# Patient Record
Sex: MALE | Race: WHITE | NOT HISPANIC OR LATINO | Employment: STUDENT | URBAN - NONMETROPOLITAN AREA
[De-identification: names, ages, dates, MRNs, and addresses within clinical notes are randomized per-mention and may not be internally consistent; named-entity substitution may affect disease eponyms.]

---

## 2024-05-15 NOTE — PROGRESS NOTES
Office Note     Name: Ranjan Moser    : 2000     MRN: [id number]     Chief Complaint  Establish Care, Anxiety, and Depression    History of Present Illness:  Ranjan Moser is a 23 y.o. male who presents today for establishment of care. His previous PCP was Jessie Alvarez at [facility]. He has a PMH of exercise induced asthma, just using inhalers as needed.     He comes in today for concerns of anxiety and depression.  He reports that he has been struggling with anxiety and depression since he was in middle school.  He reports that he is stressed all the time.  He recently went through a break-up and is unsure what he wants to do for a career and both of these things are stressful to him.  He reports that he feels like his mood cycles, reports that sometimes he feels terrible and hopeless while other times he feels wonderful.  He denies any auditory or visual hallucinations.  He denies SI/HI.  He reports that in high school, he did have some scratching and mild cutting behaviors.  He denies that he ever wanted to hurt himself, he reports that he did this for attention.  He denies suicide attempt history.  He denies ever being hospitalized for psychiatric reason.  He reports that he has tried Zoloft in the past and it was not effective.  He reports he has tried another SSRI but he cannot remember the name of it.  He is prescribed hydroxyzine and he does feel that it helps with his mood but it makes him extremely drowsy.  He reports that he uses marijuana to self medicate and it does help with his anxiety.  He denies any other drug use.      Subjective     Review of Systems:   Review of Systems   Constitutional:  Positive for fatigue. Negative for chills, fever and unexpected weight loss.   HENT:  Negative for trouble swallowing and voice change.    Eyes:  Negative for blurred vision and double vision.   Respiratory:  Negative for cough, shortness of breath and wheezing.     Cardiovascular:  Negative for chest pain, palpitations and leg swelling.   Gastrointestinal:  Positive for constipation. Negative for abdominal pain, blood in stool, diarrhea, nausea and vomiting.   Neurological:  Negative for dizziness, syncope, light-headedness and headache.   Psychiatric/Behavioral:  Positive for depressed mood. Negative for self-injury and suicidal ideas. The patient is nervous/anxious.        I have reviewed the patients family history, social history, past medical history, past surgical history and have updated it as appropriate.     Past Medical History:   Past Medical History:   Diagnosis Date    Allergic     Anxiety     Asthma     Depression        Past Surgical History:   Past Surgical History:   Procedure Laterality Date    WISDOM TOOTH EXTRACTION         Family History: History reviewed. No pertinent family history.    Social History:   Social History     Socioeconomic History    Marital status: Single   Tobacco Use    Smoking status: Never     Passive exposure: Never    Smokeless tobacco: Never   Vaping Use    Vaping status: Never Used   Substance and Sexual Activity    Alcohol use: Yes     Alcohol/week: 3.0 standard drinks of alcohol     Types: 3 Cans of beer per week    Drug use: Yes     Frequency: 5.0 times per week     Types: Marijuana    Sexual activity: Not Currently     Partners: Female       Immunizations:   Immunization History   Administered Date(s) Administered    Covid-19 (Pfizer) Gray Cap Monovalent 02/15/2022, 03/08/2022    DTaP, Unspecified 02/04/2003, 05/06/2003, 07/25/2003, 10/21/2003, 08/02/2007    Fluzone (or Fluarix & Flulaval for VFC) >6mos 02/08/2022    Hep A, 2 Dose 12/13/2019    Hep B, Adolescent or Pediatric 2002, 02/04/2003, 05/06/2003, 07/25/2003    Hib (HbOC) 04/03/2003, 07/25/2003, 10/21/2003    IPV 02/04/2003, 05/06/2003, 07/25/2003, 10/21/2003, 08/02/2007    MCV4 Unspecified 08/01/2014, 12/13/2019    MMR 03/02/2004, 08/02/2007    Pneumococcal,  "Unspecified 07/25/2003, 10/21/2003    Tdap 08/01/2014    Varicella 03/02/2004, 08/02/2007        Medications:     Current Outpatient Medications:     albuterol sulfate  (90 Base) MCG/ACT inhaler, Inhale See Admin Instructions. Inhale 2 puffs by mouth every 4 to 6 hours as needed, Disp: , Rfl:     fluticasone (FLONASE) 50 MCG/ACT nasal spray, 2 sprays Daily., Disp: , Rfl:     Fluticasone-Salmeterol (ADVAIR/WIXELA) 100-50 MCG/ACT DISKUS, INHALE 1 PUFF TWICE DAILY AS DIRECTED RINSE MOUTH AFTER USE, Disp: , Rfl:     hydrOXYzine (ATARAX) 10 MG tablet, , Disp: , Rfl:     Allergies:   No Known Allergies    Objective     Vital Signs  Vitals:    05/17/24 1134   BP: 120/80   BP Location: Left arm   Patient Position: Sitting   Cuff Size: Adult   Pulse: 69   Resp: 20   Temp: 98.2 °F (36.8 °C)   TempSrc: Temporal   SpO2: 99%   Weight: 79.8 kg (176 lb)   Height: 174.6 cm (68.75\")     Estimated body mass index is 26.18 kg/m² as calculated from the following:    Height as of this encounter: 174.6 cm (68.75\").    Weight as of this encounter: 79.8 kg (176 lb).          Physical Exam  Vitals and nursing note reviewed.   Constitutional:       General: He is not in acute distress.     Appearance: Normal appearance. He is not ill-appearing, toxic-appearing or diaphoretic.   HENT:      Head: Normocephalic and atraumatic.   Eyes:      Extraocular Movements: Extraocular movements intact.   Cardiovascular:      Rate and Rhythm: Normal rate and regular rhythm.      Heart sounds: No murmur heard.     No friction rub. No gallop.   Pulmonary:      Effort: Pulmonary effort is normal. No respiratory distress.      Breath sounds: No wheezing, rhonchi or rales.   Musculoskeletal:      Cervical back: Normal range of motion.   Skin:     Coloration: Skin is not pale.   Neurological:      Mental Status: He is alert and oriented to person, place, and time. Mental status is at baseline.      Cranial Nerves: Cranial nerves 2-12 are intact. No " dysarthria or facial asymmetry.      Motor: No tremor.      Coordination: Coordination normal.      Gait: Gait normal.   Psychiatric:         Attention and Perception: Attention normal.         Mood and Affect: Mood is anxious.         Speech: Speech normal. He is communicative. Speech is not rapid and pressured.         Behavior: Behavior normal. Behavior is cooperative.         Thought Content: Thought content normal. Thought content is not paranoid. Thought content does not include homicidal or suicidal ideation.          Assessment and Plan     1. Encounter for medical examination to establish care  -Did discuss need for obtaining yearly wellness labs and for yearly physical.  Patient would like to come back at a future date to complete this.    2. Anxiety and depression  -PHQ-9 Total Score: 24  -DIPTI 7 Total Score: 15  -He denies SI/HI.  -He did fill out a mood disorder questionnaire, which will be scanned into the chart, and his score was very high.  I am concerned patient may have bipolar disorder, as opposed to anxiety versus depression.  He has not done well with SSRI in the past.  We did discuss that if he were to have bipolar disorder, SSRI could induce a manic episode.  We did discuss that I believe best course of action is referral to behavioral health for further, more definitive evaluation and treatment.  He is agreeable to this at present time.  Because we are able to get patient seen quickly by behavioral health, I will not initiate any medications today.  He does have hydroxyzine at home to take if needed.  Did discuss consequences of drug use and possible safety concerns. He is agreeable to plan.  He has been advised that if he develops any SI/HI or self-harm behaviors, that he is to seek emergent care at local emergency department, [facility], or other psychiatric hospital.  He verbalizes understanding and has no further questions.  - Ambulatory Referral to Behavioral Health    3. Mood  swings  - Ambulatory Referral to Behavioral Health       Follow Up  Return in about 2 weeks (around 5/31/2024) for Annual physical.    Blaire Tam PA-C  [facility]

## 2024-05-17 ENCOUNTER — OFFICE VISIT (OUTPATIENT)
Dept: FAMILY MEDICINE CLINIC | Facility: CLINIC | Age: 24
End: 2024-05-17
Payer: COMMERCIAL

## 2024-05-17 VITALS
RESPIRATION RATE: 20 BRPM | OXYGEN SATURATION: 99 % | SYSTOLIC BLOOD PRESSURE: 120 MMHG | BODY MASS INDEX: 26.07 KG/M2 | TEMPERATURE: 98.2 F | HEART RATE: 69 BPM | DIASTOLIC BLOOD PRESSURE: 80 MMHG | WEIGHT: 176 LBS | HEIGHT: 69 IN

## 2024-05-17 DIAGNOSIS — R45.86 MOOD SWINGS: ICD-10-CM

## 2024-05-17 DIAGNOSIS — F41.9 ANXIETY AND DEPRESSION: ICD-10-CM

## 2024-05-17 DIAGNOSIS — F32.A ANXIETY AND DEPRESSION: ICD-10-CM

## 2024-05-17 DIAGNOSIS — Z00.00 ENCOUNTER FOR MEDICAL EXAMINATION TO ESTABLISH CARE: Primary | ICD-10-CM

## 2024-05-17 PROCEDURE — 99204 OFFICE O/P NEW MOD 45 MIN: CPT

## 2024-05-17 RX ORDER — ALBUTEROL SULFATE 90 UG/1
AEROSOL, METERED RESPIRATORY (INHALATION) SEE ADMIN INSTRUCTIONS
COMMUNITY
Start: 2024-02-20

## 2024-05-17 RX ORDER — FLUTICASONE PROPIONATE 50 MCG
2 SPRAY, SUSPENSION (ML) NASAL DAILY
COMMUNITY
Start: 2024-02-20

## 2024-05-17 RX ORDER — FLUTICASONE PROPIONATE AND SALMETEROL 100; 50 UG/1; UG/1
POWDER RESPIRATORY (INHALATION)
COMMUNITY
Start: 2024-02-22

## 2024-05-17 RX ORDER — HYDROXYZINE HYDROCHLORIDE 10 MG/1
TABLET, FILM COATED ORAL
COMMUNITY

## 2024-06-02 NOTE — PROGRESS NOTES
"    New Patient Office Visit      Date: 2024  Patient Name: Ranjan Moser  : 2000   MRN: [id number]     Referring Provider: Delaney Tam PA-C    Chief Complaint:      ICD-10-CM ICD-9-CM   1. Current mild episode of major depressive disorder without prior episode  F32.0 296.21        History of Present Illness:   Ranjan Moser is a 23 y.o. male who is here today to establish care with a psychiatric provider, at the recommendation of his primary care provider.  He states, \"I am here to see if I have bipolar disorder.\"  He is noted mood lability lately, and is worried that his mood swings are indicative of svitlana/hypomania.  Within the last few months, the patient's serious romantic relationship ended, and he is struggling to understand what he wants to do in life, what his purpose and goals are.  He has had a difficult time sleeping, so he smokes marijuana almost every night to go to sleep.  He has had an increase of alcohol intake lately, which he cites as a response to the stress in his relationship.  Other symptoms include anhedonia (\"I am struggling to find anything fun lately.\"),  poor memory, low energy, low motivation, occasional passive suicidal ideation (patient adamantly denies active SI, denies intent, denies plan), and feelings of shame and low self-esteem.  These low days will alternate with times he has better moods, and feels better about himself; \"It is like a switch flips, and I feel like a different person.\"  Most days, he needs 9 hours of sleep at least, and denies any history of insomnia or svitlana.  He has previously been on Zoloft, but did not notice any benefit to the medication.  His anxiety symptoms have increased lately, with panic attacks happening at least twice a month; these will include difficulty breathing, chest tightening, heart pounding, and feeling not present in his body.  He tends to isolate himself, and reports over thinking; he worries about looking " foolish, has rejection sensitivity dysphoria, and hates asking for help.  No SI/HI/psychotic/manic symptoms present, no obsessive thoughts/compulsive behaviors reported.     Subjective      Review of Systems:   Review of Systems   Psychiatric/Behavioral:  Positive for decreased concentration, sleep disturbance, depressed mood and stress. The patient is nervous/anxious.        Screening Scores:   PHQ-9 : 15  DIPTI-7 : 11  PTSD: 53  MDQ: 11  ASRS-V1.1: Negative  DAST:  16  AUDIT: 6    Past Psychiatric History:  History of outpatient psychiatrist: Jorge Griffin  History of outpatient therapy: yes, past (not great experience)  Previous Inpatient hospitalizations: no  Previous diagnoses: no  Previous medication trials: hydroxyzine, zoloft (didn't like it), something else  History of suicide attempts: no  History of self harming behaviors: history of cutting during high school     Abuse/trauma History:              Physical: no              Sexual: no              Emotional/Neglect: yes, from stepdad              Death/loss of relationship: recently broke up with serious girlfriend              Other trauma: no                Substance Abuse History:              Alcohol: drinks 1-2 beers per setting, every other day              Tobacco/Vape: no              Illicit Drugs: no  Marijuana/THC: smokes daily  Hallucinogens: has done shrooms (bad trip)                Legal History:  The patient has no significant history of legal issues.     Social History:  Where was patient born: [place]  Where does patient currently live: [place]  Describe living situation: Lives alone in an apartment, over the family business  Pets: 1 cat (Bubbles) and a hamster (Peanut Butter)  Highest level of education obtained: High school, working on associates in science, interested in radiology  Patient's occupation: working at [employer] (4 years)  Leisure and recreation: likes doing physical work, video games, used to play tennis, works  "out  Support system: close with parents, talks to Mom most  Muslim practices: Presybeterian     Family History:  History reviewed. No pertinent family history.    Family Psychiatric History:  Psych diagnoses: moms side has anxiety, dad has bipolar  Suicide/self harm attempts: no  Substance abuse: no    Patient Medical History:  Are there any significant health issues (current or past): no  History of seizures: no   History of head injuries: no  History of cardiac issues: no  Medications/supplements: no    Past Medical History:   Diagnosis Date    Allergic     Anxiety     Asthma     Depression        Past Surgical History:   Past Surgical History:   Procedure Laterality Date    WISDOM TOOTH EXTRACTION         Medications:     Current Outpatient Medications:     albuterol sulfate  (90 Base) MCG/ACT inhaler, Inhale See Admin Instructions. Inhale 2 puffs by mouth every 4 to 6 hours as needed, Disp: , Rfl:     fluticasone (FLONASE) 50 MCG/ACT nasal spray, 2 sprays Daily., Disp: , Rfl:     hydrOXYzine (ATARAX) 10 MG tablet, PRN, Disp: , Rfl:     buPROPion XL (Wellbutrin XL) 150 MG 24 hr tablet, Take 1 tablet by mouth Every Morning., Disp: 30 tablet, Rfl: 2    Medication Considerations:  STEPHANE reviewed and appropriate.      Allergies:   No Known Allergies    Objective   Vital Signs:   Vitals:    06/03/24 1620   BP: 115/70   Pulse: 80   SpO2: 98%   Weight: 79.2 kg (174 lb 9.6 oz)   Height: 174.6 cm (68.75\")     Body mass index is 25.97 kg/m².     Mental Status Exam:   MENTAL STATUS EXAM   General Appearance:  Cleanly groomed and dressed  Eye Contact:  Good eye contact  Attitude:  Cooperative  Motor Activity:  Normal gait, posture and fidgety  Muscle Strength:  Normal  Speech:  Normal rate, tone, volume  Language:  Spontaneous  Mood and affect:  Normal, pleasant and euthymic  Hopelessness:  5  Loneliness: 5  Thought Process:  Logical and goal-directed  Associations/ Thought Content:  No delusions  Hallucinations:  " None  Suicidal Ideations:  Not present  Homicidal Ideation:  Not present  Sensorium:  Alert and clear  Orientation:  Person, place, time and situation  Immediate Recall, Recent, and Remote Memory:  Intact  Attention Span/ Concentration:  Easily distracted  Fund of Knowledge:  Appropriate for age and educational level  Intellectual Functioning:  Average range  Insight:  Good  Judgement:  Good  Reliability:  Good  Impulse Control:  Good       SUICIDE RISK ASSESSMENT/CSSRS:  1. Does patient have thoughts of suicide? no  2. Does patient have intent for suicide? no  3. Does patient have a current plan for suicide? no  4. History of suicide attempts: no  5. Family history of suicide or attempts: no  6. History of violent behaviors towards others or property or thoughts of committing suicide: no  7. History of sexual aggression toward others: no  8. Access to firearms or weapons: no    Labs Reviewed: n/a  UDS Reviewed: n/a  Chart Reviewed: yes    Assessment / Plan      Quality Measures:   Tobacco cessation: Patient denies tobacco use. No tobacco cessation education necessary.    Depression (PHQ >9): Addressed this visit, medication management, screening score monitoring, and supportive care.    Medication Considerations:  Benzo: n/a  Stimulants: n/a   STEPHANE reviewed and appropriate.     Safety: No acute safety concerns    Risk Assessment: Risk of self-harm acutely is low. Risk of self-harm chronically is also low, but could be further elevated in the event of treatment noncompliance and/or AODA.    Visit Diagnosis/Orders Placed This Visit:  Diagnoses and all orders for this visit:    1. Current mild episode of major depressive disorder without prior episode (Primary)  -     buPROPion XL (Wellbutrin XL) 150 MG 24 hr tablet; Take 1 tablet by mouth Every Morning.  Dispense: 30 tablet; Refill: 2         Impression/Formulation:  Patient appeared alert and oriented.  Patient is voluntarily seeking psychiatric care at [facility].  Patient is receptive to assistance with maintaining a stable lifestyle.  Patient presents with history of     ICD-10-CM ICD-9-CM   1. Current mild episode of major depressive disorder without prior episode  F32.0 296.21     Given that patient smokes marijuana on a daily basis, the drug could be masking underlying ADHD, but also be contributing to his low mood and low motivation.  Patient advised to use alternative methods to help him sleep, and limit his marijuana use during this medication trial.  Given that patient was on Zoloft without incident, and he does not report any manic episodes, it is likely that he does not have bipolar 1; however, we cannot rule out bipolar 2 or bipolar mixed yet, and we discussed a book that the patient can read to educate himself on the condition.    Treatment Plan:   Start Wellbutrin  mg daily for treatment of depression.  Discussed establishing care with a therapist, book recommendations, nonpharmacologic interventions for anxiety, and alternative methods to help him sleep.  Follow-up in 6 weeks.  We will reassess for ADHD in the future, once patient has consistently not been smoking marijuana; we will reassess for bipolar disorder in the future as well, if needed.    Any medications prescribed have been sent electronically to [facility] in [place].     Patient will continue supportive psychotherapy efforts and medications as indicated. Discussed medication options and treatment plan of prescribed medication(s) as well as the risks, benefits, and potential side effects. Patient ackowledged and verbally consented to continue with current treatment plan and was educated on the importance of compliance with treatment and follow-up appointments. Patient seems reasonably able to adhere to treatment plan.      Assisted Patient in identifying risk factors which would indicate the need for higher level of care including thoughts to harm self or others and/or  self-harming behavior and encouraged Patient to contact this office, call 911, or present to the nearest emergency room should any of these events occur. Discussed crisis intervention services and means to access. Clinic will obtain release of information for current treatment team for continuity of care as needed. Patient adamantly and convincingly denies current suicidal or homicidal ideation or perceptual disturbance.     Follow Up:   Return in about 6 weeks (around 7/15/2024) for Medication Management.        CLOVER Hyatt  [facility]    This is electronically signed by CLOVER Hyatt  06/03/2024 21:12 EDT

## 2024-06-03 ENCOUNTER — OFFICE VISIT (OUTPATIENT)
Age: 24
End: 2024-06-03
Payer: COMMERCIAL

## 2024-06-03 VITALS
WEIGHT: 174.6 LBS | DIASTOLIC BLOOD PRESSURE: 70 MMHG | HEART RATE: 80 BPM | HEIGHT: 69 IN | BODY MASS INDEX: 25.86 KG/M2 | OXYGEN SATURATION: 98 % | SYSTOLIC BLOOD PRESSURE: 115 MMHG

## 2024-06-03 DIAGNOSIS — F32.0 CURRENT MILD EPISODE OF MAJOR DEPRESSIVE DISORDER WITHOUT PRIOR EPISODE: Primary | ICD-10-CM

## 2024-06-03 PROCEDURE — 90792 PSYCH DIAG EVAL W/MED SRVCS: CPT

## 2024-06-03 RX ORDER — BUPROPION HYDROCHLORIDE 150 MG/1
150 TABLET ORAL EVERY MORNING
Qty: 30 TABLET | Refills: 2 | Status: SHIPPED | OUTPATIENT
Start: 2024-06-03

## 2024-06-03 NOTE — PATIENT INSTRUCTIONS
[email or web address]    Frank recommendations:  Analy and/or Garcia: Free library access, including audiobooks    Book Recommendations:  The Bipolar Disorder Survival Guide, Riley  How to Do The Work, Dr. Karen Gonzales (The Holistic Psychologist)  No Bad Parts, Tim Beckman (IFS)  Unf**k Your Brain, Sofia Charla    Bilateral stimulation music: free on youtube and spotify    Sleep Toolbox:  Hydroxyzine  Melatonin  Kava Kava Tea  Valerian Root

## 2024-06-06 DIAGNOSIS — F32.0 CURRENT MILD EPISODE OF MAJOR DEPRESSIVE DISORDER WITHOUT PRIOR EPISODE: Primary | ICD-10-CM

## 2024-06-06 NOTE — PROGRESS NOTES
"Patient called provider, reporting a negative reaction to bupropion.  He states, \"it makes me feel crazy.  I am super emotional, super depressed, and much more irritable.\"  He even reports suicidal ideation; I advised patient to discontinue medication, and instructed him to stop by the clinic tomorrow and  samples of Vraylar 1.5 mg, and send Genesight sample.  Schedule appointment for June 19, and we can use Genesight results to start medication management  "

## 2024-06-19 ENCOUNTER — OFFICE VISIT (OUTPATIENT)
Age: 24
End: 2024-06-19
Payer: COMMERCIAL

## 2024-06-19 VITALS — WEIGHT: 170.2 LBS | BODY MASS INDEX: 25.21 KG/M2 | HEIGHT: 69 IN

## 2024-06-19 DIAGNOSIS — F32.0 CURRENT MILD EPISODE OF MAJOR DEPRESSIVE DISORDER WITHOUT PRIOR EPISODE: ICD-10-CM

## 2024-06-19 RX ORDER — HYDROXYZINE HYDROCHLORIDE 10 MG/1
10 TABLET, FILM COATED ORAL 3 TIMES DAILY PRN
Qty: 90 TABLET | Refills: 2 | Status: SHIPPED | OUTPATIENT
Start: 2024-06-19

## 2024-06-19 NOTE — PROGRESS NOTES
"              Follow Up Office Visit      Date: 2024   Patient Name: Ranjan Moser  : 2000   MRN: [id number]     Referring Provider: Delaney Tam PA-C    Chief Complaint:      ICD-10-CM ICD-9-CM   1. Current mild episode of major depressive disorder without prior episode  F32.0 296.21        History of Present Illness:   Ranjan Moser is a 23 y.o. male who is here today for follow up with medication management. After a few days on Wellbutrin, patient called provider, reporting a negative reaction to the medication; he had stated, \"It makes me feel crazy. I am super emotional, super depressed, and much more irritable.\" He has not taken the medication since, but picked up Vraylar samples as discussed. As of this appointment, he has not taken the medication, and is currently only smoking cannabis when stressed. He has also had the opportunity to sleep and gain perspective on his situation, and he now sees relationship stressors as a trigger for his extreme mood lability. He no longer feels as depressed or anxious, and feels he has come to understand his stressors; he does not wish to take daily medications unless he absolutely needs them, and wants to watch and see if he continues to display patterns of a mood disorder. No SI/HI/psychotic/manic symptoms present.     Subjective     Review of Systems:   Review of Systems   Psychiatric/Behavioral: Negative.         Screening Scores:   PHQ-9 : 14 (last visit, 15)  DIPTI-7 : 18 (last visit, 11)    Medications:     Current Outpatient Medications:     albuterol sulfate  (90 Base) MCG/ACT inhaler, Inhale See Admin Instructions. Inhale 2 puffs by mouth every 4 to 6 hours as needed, Disp: , Rfl:     fluticasone (FLONASE) 50 MCG/ACT nasal spray, 2 sprays Daily., Disp: , Rfl:     hydrOXYzine (ATARAX) 10 MG tablet, Take 1 tablet by mouth 3 (Three) Times a Day As Needed for Anxiety (or insomnia)., Disp: 90 tablet, Rfl: 2    Allergies:   No Known " "Allergies    Results Reviewed: Genesight     The following portion of the patient's history were reviewed and updated appropriately: allergies, current and past medications, family history, medical history and social history.    Objective     Vital Signs:   Vitals:    06/19/24 1618   Weight: 77.2 kg (170 lb 3.2 oz)   Height: 174.6 cm (68.75\")     Body mass index is 25.32 kg/m².     Mental Status Exam:   MENTAL STATUS EXAM   General Appearance:  Cleanly groomed and dressed  Eye Contact:  Good eye contact  Attitude:  Cooperative  Motor Activity:  Normal gait, posture and fidgety  Muscle Strength:  Normal  Speech:  Normal rate, tone, volume  Language:  Spontaneous  Mood and affect:  Normal, pleasant  Hopelessness:  Denies  Loneliness: Denies  Thought Process:  Logical  Associations/ Thought Content:  No delusions  Hallucinations:  None  Suicidal Ideations:  Not present  Homicidal Ideation:  Not present  Sensorium:  Alert and clear  Orientation:  Person, place, time and situation  Immediate Recall, Recent, and Remote Memory:  Intact  Attention Span/ Concentration:  Good  Fund of Knowledge:  Appropriate for age and educational level  Intellectual Functioning:  Average range  Insight:  Good  Judgement:  Good  Reliability:  Good  Impulse Control:  Good        SUICIDE RISK ASSESSMENT/CSSRS:  1. Does patient have thoughts of suicide? no  2. Does patient have intent for suicide? no  3. Does patient have a current plan for suicide? no  4. History of suicide attempts: no  5. Family history of suicide or attempts: no  6. History of violent behaviors towards others or property or thoughts of committing suicide: no  7. History of sexual aggression toward others: no  8. Access to firearms or weapons: no    Labs Reviewed: n/a  UDS Reviewed: n/a  Chart since last visit reviewed: yes    Assessment / Plan    Quality Measures:  Tobacco cessation: Patient denies tobacco use. No tobacco cessation education necessary.    Depression (PHQ " >9): Patient screened positive for depression with a PHQ score of 14. Follow up recommendations include medication management, suicide risk assessment, continued screening score monitoring and supportive care.    Medication Considerations:  Benzo: n/a  Stimulants: n/a   STEPHANE reviewed and appropriate.     Risk Assessment: Risk of self-harm acutely is low. Risk of self-harm chronically is also low, but could be further elevated in the event of treatment noncompliance and/or AODA.    Visit Diagnosis/Orders Placed This Visit:  Diagnoses and all orders for this visit:    1. Current mild episode of major depressive disorder without prior episode  -     hydrOXYzine (ATARAX) 10 MG tablet; Take 1 tablet by mouth 3 (Three) Times a Day As Needed for Anxiety (or insomnia).  Dispense: 90 tablet; Refill: 2         Impression/Formulation:  Patient appeared alert and oriented.  Patient is voluntarily continuing to receive psychiatric care at [facility].   Patient is receptive to assistance with maintaining a stable lifestyle.  Patient presents with history of     ICD-10-CM ICD-9-CM   1. Current mild episode of major depressive disorder without prior episode  F32.0 296.21     With the family history of bipolar disorder, it is reasonable to watch for development of this condition in this patient; however, current symptoms lack the severity or duration to qualify for a bipolar diagnosis. No medication needed at this time, just an opportunity for education and encouragement of self-awareness.    Treatment Plan:   Patient will keep hydroxyzine 10 mg on hand for occasional episodes of insomnia, but otherwise does not wish to continue regular medications at this time. Discussed book recommendations and signs of svitlana to watch for. Follow up at needed.    Any medications prescribed have been sent electronically to [facility] in [place].     Patient will continue supportive psychotherapy efforts and medications as  indicated.  Discussed medication options and treatment plan of prescribed medication(s) as well as the risks, benefits, and potential side effects. Patient will contact this office, call 911 or present to the nearest emergency room should suicidal or homicidal ideations occur. Clinic will obtain release of information for current treatment team for continuity of care as needed. Patient ackowledged and verbally consented to continue with current treatment plan and was educated on the importance of compliance with treatment and follow-up appointments.     Follow Up:   Return if symptoms worsen or fail to improve, for Medication Management.        CLOVER Goodrich  [facility]    This is electronically signed by CLOVER Goodrich  06/19/2024 22:38 EDT

## 2024-12-11 ENCOUNTER — OFFICE VISIT (OUTPATIENT)
Dept: FAMILY MEDICINE CLINIC | Facility: CLINIC | Age: 24
End: 2024-12-11
Payer: COMMERCIAL

## 2024-12-11 VITALS
TEMPERATURE: 96.9 F | WEIGHT: 178 LBS | HEIGHT: 69 IN | OXYGEN SATURATION: 97 % | BODY MASS INDEX: 26.36 KG/M2 | DIASTOLIC BLOOD PRESSURE: 66 MMHG | RESPIRATION RATE: 20 BRPM | SYSTOLIC BLOOD PRESSURE: 118 MMHG | HEART RATE: 73 BPM

## 2024-12-11 DIAGNOSIS — M54.42 ACUTE LEFT-SIDED LOW BACK PAIN WITH LEFT-SIDED SCIATICA: Primary | ICD-10-CM

## 2024-12-11 PROCEDURE — 99213 OFFICE O/P EST LOW 20 MIN: CPT | Performed by: NURSE PRACTITIONER

## 2024-12-11 RX ORDER — NAPROXEN 500 MG/1
1 TABLET ORAL 2 TIMES DAILY
COMMUNITY
Start: 2024-11-20

## 2024-12-11 RX ORDER — METHYLPREDNISOLONE 4 MG/1
TABLET ORAL
Qty: 21 TABLET | Refills: 0 | Status: SHIPPED | OUTPATIENT
Start: 2024-12-11 | End: 2024-12-16

## 2024-12-11 NOTE — PROGRESS NOTES
Office Note     Name: Ranjan Moser    : 2000     MRN: [id number]     Chief Complaint  Back Pain (Left sided low back pain down into left calf x 2 months, on/off since August.)    Subjective     History of Present Illness:  Ranjan Moser is a 24 y.o. male who presents today for back pain with radiation into his left calf.   History of Present Illness  The patient is a 23-year-old male who presents for evaluation of left-sided back pain. He is accompanied by his mother via phone.    He has been experiencing persistent left-sided back pain radiating into the left calf for approximately 4 months, with no known injury. The onset of symptoms was noted upon his return from a beach vacation in 2024, during which he engaged in skin boarding activities and experienced several falls. Initially, he attributed the discomfort to muscle soreness from his workout regimen, which includes squats and other leg exercises. However, the pain has not subsided over time. He rates the pain as an 8 on a scale of 0 to 10, with 10 being the most severe. The pain does not typically disrupt his sleep but is most pronounced upon waking and during periods of inactivity. He reports a sensation of tightness in his left leg, which limits his ability to lift it. He also experiences difficulty in performing tasks such as putting on shoes due to the pain. He has been unable to perform leg exercises for the past month due to the pain. He reports no changes in bowel or bladder function but notes recent constipation, which he attributes to a high-protein diet. He has been taking naproxen and hydroxyzine daily, and also took Pepto-Bismol yesterday. He has been using ibuprofen and Tylenol for pain management. He has not engaged in any heavy lifting since his vacation. He has been considering physical therapy but is hesitant due to insurance concerns. He has been seeing a chiropractor for about a month, who has been performing  stretching exercises on him, which have provided temporary relief. He has a history of spasticity in his legs, which was managed with therapy and exercises. He reports that his right leg is tight but not painful, while his left leg is both tight and painful. He has an appointment with a physical therapist scheduled for today but is considering postponing it until January 2025.    FAMILY HISTORY  His mother had sciatica and underwent surgery for a herniated disc 3 years ago.    MEDICATIONS  Current: naproxen, hydroxyzine, ibuprofen, Tylenol     Review of Systems:   Review of Systems   Musculoskeletal:  Positive for back pain.       Past Medical History:   Past Medical History:   Diagnosis Date    Allergic     Anxiety     Asthma     Depression        Past Surgical History:   Past Surgical History:   Procedure Laterality Date    WISDOM TOOTH EXTRACTION         Immunizations:   Immunization History   Administered Date(s) Administered    Covid-19 (Pfizer) Gray Cap Monovalent 02/15/2022, 03/08/2022    DTaP, Unspecified 02/04/2003, 05/06/2003, 07/25/2003, 10/21/2003, 08/02/2007    Fluzone (or Fluarix & Flulaval for VFC) >6mos 02/08/2022    Hep A, 2 Dose 12/13/2019    Hep B, Adolescent or Pediatric 2002, 02/04/2003, 05/06/2003, 07/25/2003    Hib (HbOC) 04/03/2003, 07/25/2003, 10/21/2003    IPV 02/04/2003, 05/06/2003, 07/25/2003, 10/21/2003, 08/02/2007    MCV4 Unspecified 08/01/2014, 12/13/2019    MMR 03/02/2004, 08/02/2007    Pneumococcal, Unspecified 07/25/2003, 10/21/2003    Tdap 08/01/2014    Varicella 03/02/2004, 08/02/2007        Medications:     Current Outpatient Medications:     albuterol sulfate  (90 Base) MCG/ACT inhaler, Inhale See Admin Instructions. Inhale 2 puffs by mouth every 4 to 6 hours as needed, Disp: , Rfl:     fluticasone (FLONASE) 50 MCG/ACT nasal spray, 2 sprays Daily., Disp: , Rfl:     hydrOXYzine (ATARAX) 10 MG tablet, Take 1 tablet by mouth 3 (Three) Times a Day As Needed for Anxiety  "(or insomnia)., Disp: 90 tablet, Rfl: 2    naproxen (NAPROSYN) 500 MG tablet, Take 1 tablet by mouth 2 (Two) Times a Day., Disp: , Rfl:     Allergies:   No Known Allergies    Family History: History reviewed. No pertinent family history.    Social History:   Social History     Socioeconomic History    Marital status: Single   Tobacco Use    Smoking status: Never     Passive exposure: Never    Smokeless tobacco: Never   Vaping Use    Vaping status: Never Used   Substance and Sexual Activity    Alcohol use: Yes     Alcohol/week: 3.0 standard drinks of alcohol     Types: 3 Cans of beer per week    Drug use: Yes     Frequency: 5.0 times per week     Types: Marijuana    Sexual activity: Not Currently     Partners: Female         Objective     Vital Signs  /66 (BP Location: Left arm, Patient Position: Sitting, Cuff Size: Adult)   Pulse 73   Temp 96.9 °F (36.1 °C) (Temporal)   Resp 20   Ht 174.6 cm (68.75\")   Wt 80.7 kg (178 lb)   SpO2 97%   BMI 26.48 kg/m²   Estimated body mass index is 26.48 kg/m² as calculated from the following:    Height as of this encounter: 174.6 cm (68.75\").    Weight as of this encounter: 80.7 kg (178 lb).          Physical Exam  Vitals and nursing note reviewed.   Constitutional:       General: He is not in acute distress.     Appearance: Normal appearance. He is normal weight. He is not ill-appearing or toxic-appearing.   HENT:      Head: Normocephalic and atraumatic.   Eyes:      General: No scleral icterus.        Right eye: No discharge.         Left eye: No discharge.      Conjunctiva/sclera: Conjunctivae normal.   Musculoskeletal:      Cervical back: Normal range of motion and neck supple.      Comments: No scoliosis, normal lordotic curvature. Non-tender spine and paraspinal muscles. Patient verbalizes soreness in the left upper buttocks. Equal leg strengths. Patellar DTR's are intact. Limited flexibility of the hamstrings. Normal gait.    Skin:     General: Skin is warm. "   Neurological:      General: No focal deficit present.      Mental Status: He is alert.   Psychiatric:         Mood and Affect: Mood normal.         Behavior: Behavior normal.         Thought Content: Thought content normal.         Judgment: Judgment normal.          Assessment and Plan     Procedures      Lab Results (last 72 hours)       ** No results found for the last 72 hours. **             No results found for this or any previous visit.     Diagnoses and all orders for this visit:    1. Acute left-sided low back pain with left-sided sciatica (Primary)  -     methylPREDNISolone (MEDROL) 4 MG dose pack; Take as directed on package instructions.  Dispense: 21 tablet; Refill: 0      Assessment & Plan  1. Sciatica.  He presents with no discernible neurological deficits. However, he exhibits difficulty in fully extending his leg, indicative of muscle tightness. The possibility of piriformis syndrome was considered. His body alignment during squatting exercises could potentially be contributing to the issue. It is noteworthy that he does not engage in regular stretching exercises. His symptoms do not suggest degenerative back conditions or severe back injuries. A Medrol Dosepak will be prescribed, which is a 6-day course of steroids. He is advised to discontinue naproxen during this period. Over-the-counter Tylenol can be used for pain management if necessary. He is encouraged to persist with stretching exercises. He is also advised to abstain from squatting exercises for a minimum of 2 weeks, or until the pain subsides. If the condition does not improve, a few sessions of physical therapy may be considered to establish a stretching program.           Follow Up  Return if symptoms worsen or fail to improve.    CLOVER Vail [facility]  [address] [place] [address] [address] [place] [address] [phone]

## 2025-01-08 ENCOUNTER — OFFICE VISIT (OUTPATIENT)
Dept: FAMILY MEDICINE CLINIC | Facility: CLINIC | Age: 25
End: 2025-01-08
Payer: COMMERCIAL

## 2025-01-08 VITALS
TEMPERATURE: 98.2 F | DIASTOLIC BLOOD PRESSURE: 70 MMHG | BODY MASS INDEX: 26.01 KG/M2 | SYSTOLIC BLOOD PRESSURE: 116 MMHG | HEART RATE: 74 BPM | HEIGHT: 69 IN | WEIGHT: 175.6 LBS | OXYGEN SATURATION: 97 % | RESPIRATION RATE: 20 BRPM

## 2025-01-08 DIAGNOSIS — M54.42 ACUTE LEFT-SIDED LOW BACK PAIN WITH LEFT-SIDED SCIATICA: Primary | ICD-10-CM

## 2025-01-08 PROCEDURE — 99213 OFFICE O/P EST LOW 20 MIN: CPT | Performed by: NURSE PRACTITIONER

## 2025-01-08 RX ORDER — FLUTICASONE PROPIONATE AND SALMETEROL 100; 50 UG/1; UG/1
POWDER RESPIRATORY (INHALATION)
COMMUNITY

## 2025-01-08 RX ORDER — CHLORCYCLIZINE HYDROCHLORIDE AND PSEUDOEPHEDRINE HYDROCHLORIDE 25; 60 MG/1; MG/1
1 TABLET ORAL 3 TIMES DAILY
COMMUNITY
Start: 2025-01-01

## 2025-01-08 RX ORDER — METHOCARBAMOL 500 MG/1
500 TABLET, FILM COATED ORAL 3 TIMES DAILY PRN
Qty: 30 TABLET | Refills: 0 | Status: SHIPPED | OUTPATIENT
Start: 2025-01-08

## 2025-01-08 NOTE — PROGRESS NOTES
Office Note     Name: Ranjna Moser    : 2000     MRN: [id number]     Chief Complaint  Back Pain (Pain has worsened.) and Follow-up    Subjective     History of Present Illness:  Ranjan Moser is a 24 y.o. male who presents today for continued left lower back pain with sciatica in the left leg.   History of Present Illness  The patient is a 24-year-old male who presents for evaluation of left-sided back pain.    He reports no significant relief from his current steroid treatment. He has been experiencing persistent pain, which he rates as an 8 on a scale of 0 to 10. The pain, which began in 2024, is localized to his left side with left sided sciatica. It intensifies during prolonged walking, extending into his calf, and is particularly severe when he attempts to sit upright. He also reports difficulty in bending over to put on his left shoe, a problem that has been ongoing since the onset of his symptoms last fall. He has not yet initiated physical therapy. He has no history of similar episodes prior to the current one. He has a history of flat feet and tight Achilles tendons, which continue to cause him discomfort. He walks on his toes and has limited flexibility in his ankles. His occupation requires him to be on his feet for extended periods, and he often resorts to squatting when lifting objects. He reports no right-sided low back pain. He has not undergone any imaging studies for his back. He has been managing the pain with daily ibuprofen, which provides some relief. He has tried naproxen in the past, but it did not provide significant relief. He takes hydroxyzine nightly, which he finds beneficial.    MEDICATIONS  Current: ibuprofen, hydroxyzine  Past: naproxen    Review of Systems:   Review of Systems    Past Medical History:   Past Medical History:   Diagnosis Date    Allergic     Anxiety     Asthma     Depression        Past Surgical History:   Past Surgical History:   Procedure  Laterality Date    WISDOM TOOTH EXTRACTION         Immunizations:   Immunization History   Administered Date(s) Administered    Covid-19 (Pfizer) Gray Cap Monovalent 02/15/2022, 03/08/2022    DTaP, Unspecified 02/04/2003, 05/06/2003, 07/25/2003, 10/21/2003, 08/02/2007    Fluzone (or Fluarix & Flulaval for VFC) >6mos 02/08/2022    Hep A, 2 Dose 12/13/2019    Hep B, Adolescent or Pediatric 2002, 02/04/2003, 05/06/2003, 07/25/2003    Hib (HbOC) 04/03/2003, 07/25/2003, 10/21/2003    IPV 02/04/2003, 05/06/2003, 07/25/2003, 10/21/2003, 08/02/2007    MCV4 Unspecified 08/01/2014, 12/13/2019    MMR 03/02/2004, 08/02/2007    Pneumococcal, Unspecified 07/25/2003, 10/21/2003    Tdap 08/01/2014    Varicella 03/02/2004, 08/02/2007        Medications:     Current Outpatient Medications:     albuterol sulfate  (90 Base) MCG/ACT inhaler, Inhale See Admin Instructions. Inhale 2 puffs by mouth every 4 to 6 hours as needed, Disp: , Rfl:     amoxicillin-clavulanate (AUGMENTIN) 875-125 MG per tablet, Take 1 tablet by mouth Every 12 (Twelve) Hours., Disp: , Rfl:     fluticasone (FLONASE) 50 MCG/ACT nasal spray, 2 sprays Daily., Disp: , Rfl:     hydrOXYzine (ATARAX) 10 MG tablet, Take 1 tablet by mouth 3 (Three) Times a Day As Needed for Anxiety (or insomnia)., Disp: 90 tablet, Rfl: 2    Stahist AD 25-60 MG tablet, Take 1 tablet by mouth 3 times a day., Disp: , Rfl:     Fluticasone-Salmeterol (ADVAIR/WIXELA) 100-50 MCG/ACT DISKUS, INHALE 1 PUFF TWICE DAILY AS DIRECTED RINSE MOUTH AFTER USE, Disp: , Rfl:     methocarbamol (ROBAXIN) 500 MG tablet, Take 1 tablet by mouth 3 (Three) Times a Day As Needed for Muscle Spasms., Disp: 30 tablet, Rfl: 0    naproxen (NAPROSYN) 500 MG tablet, Take 1 tablet by mouth 2 (Two) Times a Day. (Patient not taking: Reported on 1/8/2025), Disp: , Rfl:     Allergies:   No Known Allergies    Family History: History reviewed. No pertinent family history.    Social History:   Social History  "    Socioeconomic History    Marital status: Single   Tobacco Use    Smoking status: Never     Passive exposure: Never    Smokeless tobacco: Never   Vaping Use    Vaping status: Never Used   Substance and Sexual Activity    Alcohol use: Yes     Alcohol/week: 3.0 standard drinks of alcohol     Types: 3 Cans of beer per week    Drug use: Yes     Frequency: 5.0 times per week     Types: Marijuana    Sexual activity: Not Currently     Partners: Female         Objective     Vital Signs  /70 (BP Location: Left arm, Patient Position: Sitting, Cuff Size: Adult)   Pulse 74   Temp 98.2 °F (36.8 °C) (Temporal)   Resp 20   Ht 174.6 cm (68.75\")   Wt 79.7 kg (175 lb 9.6 oz)   SpO2 97%   BMI 26.12 kg/m²   Estimated body mass index is 26.12 kg/m² as calculated from the following:    Height as of this encounter: 174.6 cm (68.75\").    Weight as of this encounter: 79.7 kg (175 lb 9.6 oz).          Physical Exam  Vitals and nursing note reviewed.   Constitutional:       General: He is not in acute distress.     Appearance: Normal appearance. He is not ill-appearing or toxic-appearing.   HENT:      Head: Normocephalic and atraumatic.   Cardiovascular:      Rate and Rhythm: Normal rate and regular rhythm.      Heart sounds: Normal heart sounds.   Pulmonary:      Effort: Pulmonary effort is normal.      Breath sounds: Normal breath sounds.   Musculoskeletal:         General: Normal range of motion.      Cervical back: Normal range of motion and neck supple.      Comments: Normal lordotic curvature of the lumbar spine. No deformity or scoliosis. No erythema of the lumbar spine. Normal gait. Patient verbalizes pain down the left lower back with radiation into the left calf while bending forward, attempting to put his left shoe on. He has limited flexion of the ankles bilaterally.    Skin:     General: Skin is warm.   Neurological:      General: No focal deficit present.      Mental Status: He is alert.   Psychiatric:         " Behavior: Behavior normal.         Thought Content: Thought content normal.         Judgment: Judgment normal.          Assessment and Plan     Procedures      Lab Results (last 72 hours)       ** No results found for the last 72 hours. **             No results found for this or any previous visit.     Diagnoses and all orders for this visit:    1. Acute left-sided low back pain with left-sided sciatica (Primary)  -     XR Spine Lumbar 2 or 3 View (In Office)  -     methocarbamol (ROBAXIN) 500 MG tablet; Take 1 tablet by mouth 3 (Three) Times a Day As Needed for Muscle Spasms.  Dispense: 30 tablet; Refill: 0        Assessment & Plan  1. Left-sided back pain.  The patient's symptoms may be attributed to excessive stress on the lower back due to tightness in the lower extremities. A lumbar x-ray will be ordered today to further investigate the cause of the pain. A non-drowsy muscle relaxer will be prescribed, to be taken up to three times daily as needed for pain management. He is advised to continue taking ibuprofen as needed prn pain. If the x-ray results indicate any abnormalities, further treatment options such as physical therapy or specialist referral will be considered.      Follow Up  Return if symptoms worsen or fail to improve.    Patient or patient representative verbalized consent for the use of Ambient Listening during the visit with  CLOVER Vail for chart documentation. 1/8/2025  14:24 EST    CLOVER Vail [facility]  [address] [place] [address] [address] [place] [id number] [phone]  Answers submitted by the patient for this visit:  Primary Reason for Visit (Submitted on 1/8/2025)  What is the primary reason for your visit?: Problem Not Listed  Problem not listed (Submitted on 1/8/2025)  Chief Complaint: Other medical problem  anorexia: No  joint pain: No  change in stool: No  headaches: No  joint swelling: No  vertigo: No  visual change: No  Other  symptom: Extreame sciatica  Onset: 1 to 6 months  Chronicity: chronic  Frequency: constantly  Medications tried: Ibuprofen, biofreeze

## 2025-01-09 DIAGNOSIS — F32.0 CURRENT MILD EPISODE OF MAJOR DEPRESSIVE DISORDER WITHOUT PRIOR EPISODE: ICD-10-CM

## 2025-01-09 RX ORDER — HYDROXYZINE HYDROCHLORIDE 10 MG/1
10 TABLET, FILM COATED ORAL 3 TIMES DAILY PRN
Qty: 90 TABLET | Refills: 2 | Status: SHIPPED | OUTPATIENT
Start: 2025-01-09

## 2025-01-13 ENCOUNTER — TELEPHONE (OUTPATIENT)
Dept: FAMILY MEDICINE CLINIC | Facility: CLINIC | Age: 25
End: 2025-01-13

## 2025-01-13 NOTE — TELEPHONE ENCOUNTER
Caller: NORMA MARTINEZ    Relationship: Mother    Best call back number: [phone]     What orders are you requesting (i.e. lab or imaging): BACK MRI     In what timeframe would the patient need to come in: AS SOON AS POSSIBLE     Where will you receive your lab/imaging services: [facility]     Additional notes:

## 2025-01-15 ENCOUNTER — TELEPHONE (OUTPATIENT)
Dept: FAMILY MEDICINE CLINIC | Facility: CLINIC | Age: 25
End: 2025-01-15
Payer: COMMERCIAL

## 2025-01-15 DIAGNOSIS — M54.42 ACUTE LEFT-SIDED LOW BACK PAIN WITH LEFT-SIDED SCIATICA: Primary | ICD-10-CM

## 2025-01-15 DIAGNOSIS — M54.42 CHRONIC LEFT-SIDED LOW BACK PAIN WITH LEFT-SIDED SCIATICA: ICD-10-CM

## 2025-01-15 DIAGNOSIS — G89.29 CHRONIC LEFT-SIDED LOW BACK PAIN WITH LEFT-SIDED SCIATICA: ICD-10-CM

## 2025-01-15 NOTE — TELEPHONE ENCOUNTER
I spoke with the patient, confirming a referral will be made to [facility] for an MRI with and without contrast and referral to DOMENICO Lopez, at . The patient is in agreement to proceed with referrals.

## 2025-01-15 NOTE — TELEPHONE ENCOUNTER
Patient mom called and is concerned the patient continues to have low back pain with sciatica. Pain level is severe. He is having sciatic pain when coughing. He has had multiple chiropractic treatments without improvement. Mom requests a referral to [facility] for MRI of the lumbar spine. She is agreeable to proceed with an  MRI with contrast to rule out other possible diagnosis. The patient needs an appointment on a Wednesday if possible. Mom also request a referral to Neurosurgery, Dr. Ramirez at . Referrals made.

## 2025-01-23 ENCOUNTER — TELEPHONE (OUTPATIENT)
Dept: FAMILY MEDICINE CLINIC | Facility: CLINIC | Age: 25
End: 2025-01-23
Payer: COMMERCIAL

## 2025-01-23 DIAGNOSIS — M54.42 ACUTE LEFT-SIDED LOW BACK PAIN WITH LEFT-SIDED SCIATICA: Primary | ICD-10-CM

## 2025-01-23 RX ORDER — DICLOFENAC SODIUM 75 MG/1
75 TABLET, DELAYED RELEASE ORAL EVERY 12 HOURS PRN
Qty: 60 TABLET | Refills: 0 | Status: SHIPPED | OUTPATIENT
Start: 2025-01-23

## 2025-01-23 NOTE — TELEPHONE ENCOUNTER
PATIENT'S MOTHER CALLED TO REQUEST RESULT OF MRI, ADVISED MOM THE RESULT IS NOT IN PATIENT CHART AT THIS TIME, I SPOKE WITH PROVIDER AND SHE IS GOING TO LOCATE MRI AND REACH OUT TO PATIENT'S MOTHER TO DISCUSS RESULT.

## 2025-01-23 NOTE — TELEPHONE ENCOUNTER
Patient and mom called and notified of MRI results. Patient not sleeping well due to pain. He is taking Naproxen/ ibuprofen prn pain but, not helping much. Will prescribe Diclofenac, take as directed. Keep appointment with NS.

## 2025-02-18 DIAGNOSIS — M54.42 ACUTE LEFT-SIDED LOW BACK PAIN WITH LEFT-SIDED SCIATICA: ICD-10-CM

## 2025-02-19 RX ORDER — DICLOFENAC SODIUM 75 MG/1
75 TABLET, DELAYED RELEASE ORAL EVERY 12 HOURS PRN
Qty: 60 TABLET | Refills: 0 | OUTPATIENT
Start: 2025-02-19

## 2025-02-19 NOTE — TELEPHONE ENCOUNTER
Rx Refill Note  Requested Prescriptions     Pending Prescriptions Disp Refills    diclofenac (VOLTAREN) 75 MG EC tablet 60 tablet 0     Sig: Take 1 tablet by mouth Every 12 (Twelve) Hours As Needed (low back pain).      Last office visit with prescribing clinician: 1/8/2025   Last telemedicine visit with prescribing clinician: Visit date not found   Next office visit with prescribing clinician: Visit date not found                         Would you like a call back once the refill request has been completed: [] Yes [] No    If the office needs to give you a call back, can they leave a voicemail: [] Yes [] No    Elsie Shaw MA  02/19/25, 13:21 EST

## 2025-02-21 DIAGNOSIS — M54.42 ACUTE LEFT-SIDED LOW BACK PAIN WITH LEFT-SIDED SCIATICA: ICD-10-CM

## 2025-02-21 RX ORDER — ALBUTEROL SULFATE 90 UG/1
INHALANT RESPIRATORY (INHALATION) SEE ADMIN INSTRUCTIONS
OUTPATIENT
Start: 2025-02-21

## 2025-02-21 RX ORDER — DICLOFENAC SODIUM 75 MG/1
TABLET, DELAYED RELEASE ORAL
Qty: 60 TABLET | Refills: 0 | Status: SHIPPED | OUTPATIENT
Start: 2025-02-21

## 2025-03-12 RX ORDER — ALBUTEROL SULFATE 90 UG/1
2 INHALANT RESPIRATORY (INHALATION) EVERY 6 HOURS PRN
Qty: 18 G | Refills: 1 | Status: SHIPPED | OUTPATIENT
Start: 2025-03-12

## 2025-03-31 ENCOUNTER — OFFICE VISIT (OUTPATIENT)
Dept: FAMILY MEDICINE CLINIC | Facility: CLINIC | Age: 25
End: 2025-03-31
Payer: COMMERCIAL

## 2025-03-31 VITALS
TEMPERATURE: 97.8 F | HEIGHT: 69 IN | HEART RATE: 78 BPM | SYSTOLIC BLOOD PRESSURE: 130 MMHG | BODY MASS INDEX: 25.68 KG/M2 | OXYGEN SATURATION: 100 % | WEIGHT: 173.4 LBS | DIASTOLIC BLOOD PRESSURE: 80 MMHG | RESPIRATION RATE: 16 BRPM

## 2025-03-31 DIAGNOSIS — R06.02 SHORTNESS OF BREATH: ICD-10-CM

## 2025-03-31 DIAGNOSIS — R07.9 CHEST PAIN, UNSPECIFIED TYPE: Primary | ICD-10-CM

## 2025-03-31 PROCEDURE — 93000 ELECTROCARDIOGRAM COMPLETE: CPT | Performed by: NURSE PRACTITIONER

## 2025-03-31 PROCEDURE — 99213 OFFICE O/P EST LOW 20 MIN: CPT | Performed by: NURSE PRACTITIONER

## 2025-03-31 RX ORDER — PREGABALIN 75 MG/1
75 CAPSULE ORAL
COMMUNITY
Start: 2025-03-18 | End: 2025-04-17

## 2025-03-31 NOTE — PROGRESS NOTES
Office Note     Name: Ranjan Moser    : 2000     MRN: [id number]     Chief Complaint  Shortness of Breath and Anxiety    Subjective     History of Present Illness:  Ranjan Moser is a 24 y.o. male who presents today for   History of Present Illness  The patient is a 24-year-old male who presents for evaluation of chest pain, shortness of breath, and anxiety. He is accompanied by his mother.    He was previously prescribed gabapentin 100 mg for a week, which he discontinued due to adverse effects. He then started pregabalin 75 mg at the beginning of the previous week. Approximately 3 to 4 days after starting pregabalin, he experienced difficulty achieving deep breaths and chest pain during forceful inhalation. These symptoms were severe enough to disrupt his sleep and exacerbate his anxiety. He reports that his inhaler, which typically provides relief, was ineffective during these episodes. He also experienced panic attacks, which were not characteristic of his usual anxiety symptoms. He does not believe these symptoms are allergy-related as he did not have rhinorrhea or wheezing. He has a history of asthma, which has been more pronounced over the past 4 years and is likely triggered by allergies. He recalls a significant exacerbation of his asthma symptoms when he acquired a cat, necessitating the use of an inhaler. He reports that the current episode did not resemble a typical asthma attack. He was unable to lie on his chest or side due to discomfort and had to sleep on his back, even then struggling to achieve deep breaths. He reports that the chest pain has slightly improved, but he continues to experience shortness of breath and feels as though he must consciously breathe. He does not report any current chest pain or irregular heartbeat. He has no history of cardiac disease, congenital heart defects, or valvular issues. He has never undergone an EKG.    He has a long-standing history of  anxiety but does not recall experiencing chest pain as a symptom with anxiety. He takes hydroxyzine nightly for anxiety, which has been beneficial in the past but it did not improve his anxiety during his recent episode of chest pain and shortness of breath.    He was on gabapentin 100 mg for about a week and then switched to pregabalin 75 mg. Both medications made him feel like he was going crazy. He reports also recently taking an OTC antihistamine D along with his hydroxyzine 10 mg dosing prior to onset of his symptoms.     Supplemental Information  Due to his acute back pain, physical therapy was recommended along with gabapentin.He did both of those. He went to physical therapy twice but decided he would do home exercises instead. He has been doing the exercises which learned in physical therapy. He also takes diclofenac daily. He reports some improvement in his back pain     MEDICATIONS  Current: Pregabalin, hydroxyzine, diclofenac.  Discontinued: Gabapentin.    Review of Systems:   Review of Systems    Past Medical History:   Past Medical History:   Diagnosis Date    Allergic     Anxiety     Asthma     Depression        Past Surgical History:   Past Surgical History:   Procedure Laterality Date    WISDOM TOOTH EXTRACTION         Immunizations:   Immunization History   Administered Date(s) Administered    Covid-19 (Pfizer) Gray Cap Monovalent 02/15/2022, 03/08/2022    DTaP, Unspecified 02/04/2003, 05/06/2003, 07/25/2003, 10/21/2003, 08/02/2007    Fluzone (or Fluarix & Flulaval for VFC) >6mos 02/08/2022    Hep A, 2 Dose 12/13/2019    Hep B, Adolescent or Pediatric 2002, 02/04/2003, 05/06/2003, 07/25/2003    Hib (HbOC) 04/03/2003, 07/25/2003, 10/21/2003    IPV 02/04/2003, 05/06/2003, 07/25/2003, 10/21/2003, 08/02/2007    MCV4 Unspecified 08/01/2014, 12/13/2019    MMR 03/02/2004, 08/02/2007    Pneumococcal, Unspecified 07/25/2003, 10/21/2003    Tdap 08/01/2014    Varicella 03/02/2004, 08/02/2007         Medications:     Current Outpatient Medications:     pregabalin (LYRICA) 75 MG capsule, Take 1 capsule by mouth., Disp: , Rfl:     albuterol sulfate  (90 Base) MCG/ACT inhaler, Inhale 2 puffs Every 6 (Six) Hours As Needed for Wheezing. Inhale 2 puffs by mouth every 4 to 6 hours as needed, Disp: 18 g, Rfl: 1    amoxicillin-clavulanate (AUGMENTIN) 875-125 MG per tablet, Take 1 tablet by mouth Every 12 (Twelve) Hours. (Patient not taking: Reported on 3/31/2025), Disp: , Rfl:     diclofenac (VOLTAREN) 75 MG EC tablet, TAKE 1 TABLET BY MOUTH EVERY 12 HOURS AS NEEDED FOR LOW BACK PAIN, Disp: 60 tablet, Rfl: 0    fluticasone (FLONASE) 50 MCG/ACT nasal spray, 2 sprays Daily., Disp: , Rfl:     Fluticasone-Salmeterol (ADVAIR/WIXELA) 100-50 MCG/ACT DISKUS, INHALE 1 PUFF TWICE DAILY AS DIRECTED RINSE MOUTH AFTER USE, Disp: , Rfl:     hydrOXYzine (ATARAX) 10 MG tablet, Take 1 tablet by mouth 3 (Three) Times a Day As Needed for Anxiety (or insomnia)., Disp: 90 tablet, Rfl: 2    methocarbamol (ROBAXIN) 500 MG tablet, Take 1 tablet by mouth 3 (Three) Times a Day As Needed for Muscle Spasms., Disp: 30 tablet, Rfl: 0    Stahist AD 25-60 MG tablet, Take 1 tablet by mouth 3 times a day., Disp: , Rfl:     Allergies:   No Known Allergies    Family History: History reviewed. No pertinent family history.    Social History:   Social History     Socioeconomic History    Marital status: Single   Tobacco Use    Smoking status: Never     Passive exposure: Never    Smokeless tobacco: Never   Vaping Use    Vaping status: Never Used   Substance and Sexual Activity    Alcohol use: Yes     Alcohol/week: 3.0 standard drinks of alcohol     Types: 3 Cans of beer per week    Drug use: Yes     Frequency: 5.0 times per week     Types: Marijuana    Sexual activity: Not Currently     Partners: Female         Objective     Vital Signs  /80 (BP Location: Right arm, Patient Position: Sitting, Cuff Size: Large Adult)   Pulse 78   Temp 97.8 °F  "(36.6 °C) (Temporal)   Resp 16   Ht 174.6 cm (68.75\")   Wt 78.7 kg (173 lb 6.4 oz)   SpO2 100%   BMI 25.79 kg/m²   Estimated body mass index is 25.79 kg/m² as calculated from the following:    Height as of this encounter: 174.6 cm (68.75\").    Weight as of this encounter: 78.7 kg (173 lb 6.4 oz).          Physical Exam  Vitals and nursing note reviewed.   Constitutional:       General: He is not in acute distress.     Appearance: Normal appearance. He is not ill-appearing or toxic-appearing.   HENT:      Head: Normocephalic and atraumatic.   Cardiovascular:      Rate and Rhythm: Normal rate and regular rhythm.      Heart sounds: Normal heart sounds.   Pulmonary:      Effort: Pulmonary effort is normal.      Breath sounds: Normal breath sounds.   Musculoskeletal:      Cervical back: Normal range of motion and neck supple.   Skin:     General: Skin is warm.   Neurological:      General: No focal deficit present.      Mental Status: He is alert.   Psychiatric:         Mood and Affect: Mood normal.         Behavior: Behavior normal.         Judgment: Judgment normal.          Assessment and Plan       ECG 12 Lead    Date/Time: 3/31/2025 6:27 PM  Performed by: Marcin Steve APRN    Authorized by: Marcin Steve APRN  Comparison: not compared with previous ECG   Rhythm: sinus rhythm  Rate: normal  BPM: 73  QRS axis: normal    Clinical impression: normal ECG          No results found for this or any previous visit.     Diagnoses and all orders for this visit:    1. Chest pain, unspecified type (Primary)  -     ECG 12 Lead  -     XR Chest PA & Lateral; Future  -     C-reactive Protein; Future  -     Sedimentation Rate; Future  -     TSH; Future  -     CBC No Differential; Future  -     Comprehensive Metabolic Panel; Future    2. Shortness of breath  -     C-reactive Protein; Future  -     Sedimentation Rate; Future  -     TSH; Future  -     CBC No Differential; Future  -     Comprehensive Metabolic Panel; Future     "    Assessment & Plan  1. Chest pain.  The etiology of the chest pain is does not appear cardiac due to a normal EKG which shows NSR. The patient had stopped the gabapentin prior to the chest pain and had taken pregabalin the day of his chest pain. He will stop the pregablin and continue diclofenac prn.    2. Shortness of breath.  The shortness of breath may be related to the recent initiation of pregabalin. His vital signs are currently stable with a blood pressure of 130/80, oxygen saturation at 100%, temperature at 98.7, and pulse rate at 78. An EKG shows NSR. Recommend the patient use his albuterol inhaler as directed and go to the emergency room if his shortness of breath does not resolve.     3. Anxiety.  The anxiety appears to be exacerbated by the chest pain and shortness of breath, possibly as a side effect of pregabalin. He reports taking hydroxyzine for anxiety, which has been effective in the past The patient is prescribed hydroxyzine 10 mg, 1 tablet po tid prn anxiety. He can increase his dosage to 20 mg tid prn for uncontrolled anxiety. The patient will have a lab draw today to look for contributing causes to his anxiety. He will be notified of lab results.       Follow Up  Return if symptoms worsen or fail to improve.    Patient or patient representative verbalized consent for the use of Ambient Listening during the visit with  CLOVER Vail for chart documentation. 4/1/2025  18:28 EDT    CLOVER Vail  [facility]  [address] [place] [address] [address] [place] [id number] [phone]

## 2025-04-01 ENCOUNTER — LAB (OUTPATIENT)
Dept: FAMILY MEDICINE CLINIC | Facility: CLINIC | Age: 25
End: 2025-04-01
Payer: COMMERCIAL

## 2025-04-01 DIAGNOSIS — R07.9 CHEST PAIN, UNSPECIFIED TYPE: ICD-10-CM

## 2025-04-01 DIAGNOSIS — R06.02 SHORTNESS OF BREATH: ICD-10-CM

## 2025-04-01 PROCEDURE — 86140 C-REACTIVE PROTEIN: CPT | Performed by: NURSE PRACTITIONER

## 2025-04-01 PROCEDURE — 85652 RBC SED RATE AUTOMATED: CPT | Performed by: NURSE PRACTITIONER

## 2025-04-01 PROCEDURE — 80050 GENERAL HEALTH PANEL: CPT | Performed by: NURSE PRACTITIONER

## 2025-04-02 LAB
ALBUMIN SERPL-MCNC: 4.1 G/DL (ref 3.5–5.2)
ALBUMIN/GLOB SERPL: 1.3 G/DL
ALP SERPL-CCNC: 65 U/L (ref 39–117)
ALT SERPL W P-5'-P-CCNC: 33 U/L (ref 1–41)
ANION GAP SERPL CALCULATED.3IONS-SCNC: 9.8 MMOL/L (ref 5–15)
AST SERPL-CCNC: 25 U/L (ref 1–40)
BILIRUB SERPL-MCNC: 0.5 MG/DL (ref 0–1.2)
BUN SERPL-MCNC: 17 MG/DL (ref 6–20)
BUN/CREAT SERPL: 16.2 (ref 7–25)
CALCIUM SPEC-SCNC: 9.5 MG/DL (ref 8.6–10.5)
CHLORIDE SERPL-SCNC: 104 MMOL/L (ref 98–107)
CO2 SERPL-SCNC: 26.2 MMOL/L (ref 22–29)
CREAT SERPL-MCNC: 1.05 MG/DL (ref 0.76–1.27)
CRP SERPL-MCNC: <0.3 MG/DL (ref 0–0.5)
DEPRECATED RDW RBC AUTO: 38.8 FL (ref 37–54)
EGFRCR SERPLBLD CKD-EPI 2021: 102.9 ML/MIN/1.73
ERYTHROCYTE [DISTWIDTH] IN BLOOD BY AUTOMATED COUNT: 11.7 % (ref 12.3–15.4)
ERYTHROCYTE [SEDIMENTATION RATE] IN BLOOD: 1 MM/HR (ref 0–15)
GLOBULIN UR ELPH-MCNC: 3.2 GM/DL
GLUCOSE SERPL-MCNC: 75 MG/DL (ref 65–99)
HCT VFR BLD AUTO: 46 % (ref 37.5–51)
HGB BLD-MCNC: 16 G/DL (ref 13–17.7)
MCH RBC QN AUTO: 31.6 PG (ref 26.6–33)
MCHC RBC AUTO-ENTMCNC: 34.8 G/DL (ref 31.5–35.7)
MCV RBC AUTO: 90.9 FL (ref 79–97)
PLATELET # BLD AUTO: 261 10*3/MM3 (ref 140–450)
PMV BLD AUTO: 11.3 FL (ref 6–12)
POTASSIUM SERPL-SCNC: 3.8 MMOL/L (ref 3.5–5.2)
PROT SERPL-MCNC: 7.3 G/DL (ref 6–8.5)
RBC # BLD AUTO: 5.06 10*6/MM3 (ref 4.14–5.8)
SODIUM SERPL-SCNC: 140 MMOL/L (ref 136–145)
TSH SERPL DL<=0.05 MIU/L-ACNC: 0.83 UIU/ML (ref 0.27–4.2)
WBC NRBC COR # BLD AUTO: 8 10*3/MM3 (ref 3.4–10.8)

## 2025-05-07 RX ORDER — ALBUTEROL SULFATE 90 UG/1
2 INHALANT RESPIRATORY (INHALATION) EVERY 6 HOURS PRN
Qty: 18 G | Refills: 1 | Status: SHIPPED | OUTPATIENT
Start: 2025-05-07

## 2025-07-07 RX ORDER — ALBUTEROL SULFATE 90 UG/1
2 INHALANT RESPIRATORY (INHALATION) EVERY 6 HOURS PRN
Qty: 18 G | Refills: 1 | Status: SHIPPED | OUTPATIENT
Start: 2025-07-07

## 2025-07-29 DIAGNOSIS — F32.0 CURRENT MILD EPISODE OF MAJOR DEPRESSIVE DISORDER WITHOUT PRIOR EPISODE: ICD-10-CM

## 2025-07-29 DIAGNOSIS — M54.42 ACUTE LEFT-SIDED LOW BACK PAIN WITH LEFT-SIDED SCIATICA: ICD-10-CM

## 2025-07-29 RX ORDER — HYDROXYZINE HYDROCHLORIDE 10 MG/1
10 TABLET, FILM COATED ORAL 3 TIMES DAILY PRN
Qty: 90 TABLET | Refills: 2 | Status: SHIPPED | OUTPATIENT
Start: 2025-07-29

## 2025-07-29 RX ORDER — ALBUTEROL SULFATE 90 UG/1
2 INHALANT RESPIRATORY (INHALATION) EVERY 6 HOURS PRN
Qty: 18 G | Refills: 1 | Status: SHIPPED | OUTPATIENT
Start: 2025-07-29

## 2025-07-29 NOTE — TELEPHONE ENCOUNTER
Prescription sent.  Please schedule follow-up appointment before next refill request is needed; no further prescriptions until patient is seen.

## 2025-07-31 RX ORDER — DICLOFENAC SODIUM 75 MG/1
TABLET, DELAYED RELEASE ORAL
Qty: 60 TABLET | Refills: 0 | OUTPATIENT
Start: 2025-07-31